# Patient Record
Sex: FEMALE | Race: WHITE | NOT HISPANIC OR LATINO | URBAN - METROPOLITAN AREA
[De-identification: names, ages, dates, MRNs, and addresses within clinical notes are randomized per-mention and may not be internally consistent; named-entity substitution may affect disease eponyms.]

---

## 2024-10-09 ENCOUNTER — TELEPHONE (OUTPATIENT)
Dept: ORTHOPEDICS | Facility: CLINIC | Age: 69
End: 2024-10-09

## 2024-10-09 NOTE — TELEPHONE ENCOUNTER
----- Message from Vidhi sent at 10/9/2024  3:27 PM CDT -----  Contact: Sara  Type:  Needs Medical Advice    Who Called: Sara  Symptoms (please be specific): /   How long has patient had these symptoms:  /  Pharmacy name and phone #:  /  Would the patient rather a call back or a response via [facility]? call  Best Call Back Number: [phone]   Additional Information: previous pt, would like to know the name of inserts for her shoes

## 2024-10-09 NOTE — TELEPHONE ENCOUNTER
Called and spoke with patient. I let her know that per her last note at [facility], she needs Josiane arch rival inserts.

## 2025-05-19 ENCOUNTER — TELEPHONE (OUTPATIENT)
Dept: ORTHOPEDICS | Facility: CLINIC | Age: 70
End: 2025-05-19

## 2025-05-19 DIAGNOSIS — M25.562 LEFT KNEE PAIN, UNSPECIFIED CHRONICITY: Primary | ICD-10-CM

## 2025-05-19 NOTE — TELEPHONE ENCOUNTER
----- Message from Priya sent at 5/19/2025  4:34 PM CDT -----  Contact: ROBER VEGA [[id number]]  ..Type:  Patient Requesting CallWho Called: ROBER VEGA [[id number]]Does the patient know what this is regarding?: [facility], discuss left knee replacement Would the patient rather a call back or a response via [facility]? callBest Call Back Number: .[phone] Information:

## 2025-06-18 ENCOUNTER — OFFICE VISIT (OUTPATIENT)
Dept: ORTHOPEDICS | Facility: CLINIC | Age: 70
End: 2025-06-18
Payer: MEDICARE

## 2025-06-18 ENCOUNTER — HOSPITAL ENCOUNTER (OUTPATIENT)
Dept: RADIOLOGY | Facility: HOSPITAL | Age: 70
Discharge: HOME | End: 2025-06-18
Attending: ORTHOPAEDIC SURGERY
Payer: MEDICARE

## 2025-06-18 DIAGNOSIS — Z96.651 STATUS POST RIGHT KNEE REPLACEMENT: ICD-10-CM

## 2025-06-18 DIAGNOSIS — M17.12 PRIMARY OSTEOARTHRITIS OF LEFT KNEE: Primary | ICD-10-CM

## 2025-06-18 DIAGNOSIS — M25.562 LEFT KNEE PAIN, UNSPECIFIED CHRONICITY: ICD-10-CM

## 2025-06-18 PROCEDURE — 99214 OFFICE O/P EST MOD 30 MIN: CPT | Mod: 25,S$PBB,, | Performed by: ORTHOPAEDIC SURGERY

## 2025-06-18 PROCEDURE — 99999 PR PBB SHADOW E&M-EST. PATIENT-LVL II: CPT | Mod: PBBFAC,,, | Performed by: ORTHOPAEDIC SURGERY

## 2025-06-18 PROCEDURE — 73564 X-RAY EXAM KNEE 4 OR MORE: CPT | Mod: TC,LT

## 2025-06-18 PROCEDURE — 99212 OFFICE O/P EST SF 10 MIN: CPT | Mod: PBBFAC,25 | Performed by: ORTHOPAEDIC SURGERY

## 2025-06-18 PROCEDURE — 73564 X-RAY EXAM KNEE 4 OR MORE: CPT | Mod: 26,LT,, | Performed by: RADIOLOGY

## 2025-06-18 PROCEDURE — 20610 DRAIN/INJ JOINT/BURSA W/O US: CPT | Mod: PBBFAC,LT | Performed by: ORTHOPAEDIC SURGERY

## 2025-06-18 PROCEDURE — 99999PBSHW PR PBB SHADOW TECHNICAL ONLY FILED TO HB: Mod: PBBFAC,,,

## 2025-06-18 RX ORDER — LEVOTHYROXINE SODIUM 100 UG/1
100 TABLET ORAL
COMMUNITY

## 2025-06-18 RX ORDER — CELECOXIB 200 MG/1
200 CAPSULE ORAL 2 TIMES DAILY
Qty: 60 CAPSULE | Refills: 2 | Status: SHIPPED | OUTPATIENT
Start: 2025-06-18

## 2025-06-18 RX ORDER — ZOLPIDEM TARTRATE 10 MG/1
10 TABLET ORAL NIGHTLY
COMMUNITY

## 2025-06-18 RX ORDER — BUPROPION HYDROCHLORIDE 150 MG/1
150 TABLET ORAL
COMMUNITY
Start: 2025-03-27

## 2025-06-18 RX ADMIN — METHYLPREDNISOLONE ACETATE 40 MG: 40 INJECTION, SUSPENSION INTRALESIONAL; INTRAMUSCULAR; INTRASYNOVIAL; SOFT TISSUE at 01:06

## 2025-06-18 NOTE — PROCEDURES
Large Joint Aspiration/Injection: L knee    Date/Time: 6/18/2025 1:00 PM    Performed by: Chris Garcia MD  Authorized by: Chris Garcia MD    Consent Done?:  Yes (Verbal)  Indications:  Pain and arthritis  Timeout: prior to procedure the correct patient, procedure, and site was verified    Prep: patient was prepped and draped in usual sterile fashion      Local anesthesia used?: Yes    Anesthesia:  Local infiltration  Local anesthetic:  Topical anesthetic and bupivacaine 0.5% without epinephrine  Anesthetic total (ml):  1      Details:  Needle Size:  22 G  Ultrasonic Guidance for needle placement?: No    Approach:  Medial  Location:  Knee  Site:  L knee  Medications:  40 mg methylPREDNISolone acetate 40 mg/mL  Patient tolerance:  Patient tolerated the procedure well with no immediate complications

## 2025-06-18 NOTE — PATIENT INSTRUCTIONS
Encounter Diagnoses   Name Primary?    Primary osteoarthritis of left knee Yes    Status post right knee replacement        ASSESSMENT:  Left knee osteoarthritis   Left calf strain with peroneal neuritis symptoms, lateral leg to the ankle    TREATMENT/PLAN:  40 mg Depo-Medrol + 1cc marcaine 0.5% in left knee  Prescribed Celebrex 200 mg BID 60 tablets 1 refill, discontinue Ibuprofen   Continue with an hinged knee brace for support.  May wean out of the brace in a week and a half or 2 if pain is significantly better with the injection and Celebrex  Office recheck in 5 or 6 weeks if pain has not significantly resolved.

## 2025-06-19 RX ORDER — METHYLPREDNISOLONE ACETATE 40 MG/ML
40 INJECTION, SUSPENSION INTRA-ARTICULAR; INTRALESIONAL; INTRAMUSCULAR; SOFT TISSUE
Status: DISCONTINUED | OUTPATIENT
Start: 2025-06-18 | End: 2025-06-19 | Stop reason: HOSPADM

## 2025-06-20 ENCOUNTER — TELEPHONE (OUTPATIENT)
Dept: ORTHOPEDICS | Facility: CLINIC | Age: 70
End: 2025-06-20
Payer: MEDICARE

## 2025-06-20 NOTE — TELEPHONE ENCOUNTER
Spoke with patient and let her know that the steroid injections take about a week to determine if they are working. She can but ice or heat on her knee and take tylenol along with the Celebrex for pain relief. Advised patient to give us a call and update  us on the knee pain next week. If she is still having trouble we can talk to Dr. Garcia and see if her would like to try get injections for this patient. Patient understood and will call back.